# Patient Record
Sex: FEMALE | Race: WHITE | Employment: PART TIME
[De-identification: names, ages, dates, MRNs, and addresses within clinical notes are randomized per-mention and may not be internally consistent; named-entity substitution may affect disease eponyms.]

---

## 2017-07-22 ENCOUNTER — HEALTH MAINTENANCE LETTER (OUTPATIENT)
Age: 27
End: 2017-07-22

## 2019-06-29 ENCOUNTER — HOSPITAL ENCOUNTER (EMERGENCY)
Facility: CLINIC | Age: 29
Discharge: HOME | End: 2019-06-29
Attending: EMERGENCY MEDICINE | Admitting: EMERGENCY MEDICINE
Payer: COMMERCIAL

## 2019-06-29 ENCOUNTER — APPOINTMENT (OUTPATIENT)
Dept: GENERAL RADIOLOGY | Facility: CLINIC | Age: 29
End: 2019-06-29
Attending: EMERGENCY MEDICINE
Payer: COMMERCIAL

## 2019-06-29 VITALS
TEMPERATURE: 97.7 F | RESPIRATION RATE: 16 BRPM | OXYGEN SATURATION: 97 % | SYSTOLIC BLOOD PRESSURE: 110 MMHG | HEART RATE: 72 BPM | DIASTOLIC BLOOD PRESSURE: 69 MMHG

## 2019-06-29 DIAGNOSIS — R07.9 CHEST PAIN, UNSPECIFIED TYPE: ICD-10-CM

## 2019-06-29 LAB
ANION GAP SERPL CALCULATED.3IONS-SCNC: 5 MMOL/L (ref 3–14)
B-HCG FREE SERPL-ACNC: <5 IU/L
BASOPHILS # BLD AUTO: 0.1 10E9/L (ref 0–0.2)
BASOPHILS NFR BLD AUTO: 0.9 %
BUN SERPL-MCNC: 10 MG/DL (ref 7–30)
CALCIUM SERPL-MCNC: 8.3 MG/DL (ref 8.5–10.1)
CHLORIDE SERPL-SCNC: 105 MMOL/L (ref 94–109)
CO2 SERPL-SCNC: 27 MMOL/L (ref 20–32)
CREAT SERPL-MCNC: 0.8 MG/DL (ref 0.52–1.04)
D DIMER PPP FEU-MCNC: 0.3 UG/ML FEU (ref 0–0.5)
DIFFERENTIAL METHOD BLD: NORMAL
EOSINOPHIL # BLD AUTO: 0.2 10E9/L (ref 0–0.7)
EOSINOPHIL NFR BLD AUTO: 1.5 %
ERYTHROCYTE [DISTWIDTH] IN BLOOD BY AUTOMATED COUNT: 13.1 % (ref 10–15)
GFR SERPL CREATININE-BSD FRML MDRD: >90 ML/MIN/{1.73_M2}
GLUCOSE SERPL-MCNC: 93 MG/DL (ref 70–99)
HCT VFR BLD AUTO: 42.1 % (ref 35–47)
HGB BLD-MCNC: 13.8 G/DL (ref 11.7–15.7)
IMM GRANULOCYTES # BLD: 0 10E9/L (ref 0–0.4)
IMM GRANULOCYTES NFR BLD: 0.3 %
LYMPHOCYTES # BLD AUTO: 1.7 10E9/L (ref 0.8–5.3)
LYMPHOCYTES NFR BLD AUTO: 16.4 %
MCH RBC QN AUTO: 29.3 PG (ref 26.5–33)
MCHC RBC AUTO-ENTMCNC: 32.8 G/DL (ref 31.5–36.5)
MCV RBC AUTO: 89 FL (ref 78–100)
MONOCYTES # BLD AUTO: 0.9 10E9/L (ref 0–1.3)
MONOCYTES NFR BLD AUTO: 8.8 %
NEUTROPHILS # BLD AUTO: 7.6 10E9/L (ref 1.6–8.3)
NEUTROPHILS NFR BLD AUTO: 72.1 %
NRBC # BLD AUTO: 0 10*3/UL
NRBC BLD AUTO-RTO: 0 /100
PLATELET # BLD AUTO: 370 10E9/L (ref 150–450)
POTASSIUM SERPL-SCNC: 3.8 MMOL/L (ref 3.4–5.3)
RBC # BLD AUTO: 4.71 10E12/L (ref 3.8–5.2)
SODIUM SERPL-SCNC: 139 MMOL/L (ref 133–144)
TROPONIN I SERPL-MCNC: <0.015 UG/L (ref 0–0.04)
TROPONIN I SERPL-MCNC: <0.015 UG/L (ref 0–0.04)
WBC # BLD AUTO: 10.5 10E9/L (ref 4–11)

## 2019-06-29 PROCEDURE — 25000128 H RX IP 250 OP 636: Performed by: EMERGENCY MEDICINE

## 2019-06-29 PROCEDURE — 71046 X-RAY EXAM CHEST 2 VIEWS: CPT

## 2019-06-29 PROCEDURE — 96374 THER/PROPH/DIAG INJ IV PUSH: CPT

## 2019-06-29 PROCEDURE — 99285 EMERGENCY DEPT VISIT HI MDM: CPT | Mod: 25

## 2019-06-29 PROCEDURE — 84702 CHORIONIC GONADOTROPIN TEST: CPT

## 2019-06-29 PROCEDURE — 93005 ELECTROCARDIOGRAM TRACING: CPT

## 2019-06-29 PROCEDURE — 80048 BASIC METABOLIC PNL TOTAL CA: CPT | Performed by: EMERGENCY MEDICINE

## 2019-06-29 PROCEDURE — 85025 COMPLETE CBC W/AUTO DIFF WBC: CPT | Performed by: EMERGENCY MEDICINE

## 2019-06-29 PROCEDURE — 85379 FIBRIN DEGRADATION QUANT: CPT | Performed by: EMERGENCY MEDICINE

## 2019-06-29 PROCEDURE — 93005 ELECTROCARDIOGRAM TRACING: CPT | Mod: 76

## 2019-06-29 PROCEDURE — 84484 ASSAY OF TROPONIN QUANT: CPT | Mod: 91 | Performed by: EMERGENCY MEDICINE

## 2019-06-29 RX ORDER — KETOROLAC TROMETHAMINE 15 MG/ML
15 INJECTION, SOLUTION INTRAMUSCULAR; INTRAVENOUS ONCE
Status: COMPLETED | OUTPATIENT
Start: 2019-06-29 | End: 2019-06-29

## 2019-06-29 RX ADMIN — KETOROLAC TROMETHAMINE 15 MG: 15 INJECTION, SOLUTION INTRAMUSCULAR; INTRAVENOUS at 08:00

## 2019-06-29 ASSESSMENT — ENCOUNTER SYMPTOMS
FEVER: 0
DIAPHORESIS: 0
VOMITING: 0
NAUSEA: 0
COUGH: 0
SHORTNESS OF BREATH: 1

## 2019-06-29 NOTE — ED AVS SNAPSHOT
[facility]  [address]  [facility] [address]  Phone:  [phone]  Fax:  [phone]                                    Jessica Juarez   MRN: [id number]    Department:  [facility] Emergency Department   Date of Visit:  6/29/2019           After Visit Summary Signature Page    I have received my discharge instructions, and my questions have been answered. I have discussed any challenges I see with this plan with the nurse or doctor.    ..........................................................................................................................................  Patient/Patient Representative Signature      ..........................................................................................................................................  Patient Representative Print Name and Relationship to Patient    ..................................................               ................................................  Date                                   Time    ..........................................................................................................................................  Reviewed by Signature/Title    ...................................................              ..............................................  Date                                               Time          22EPIC Rev 08/18

## 2019-06-29 NOTE — ED PROVIDER NOTES
History     Chief Complaint:  Chest Pain    HPI   Jessica Juarez is a 29 year old female with a history of heart burn who presents with chest pain. The patient states she woke up around 0430 she woke up with crushing pain to her chest that 'fells like a brick is sitting on it.' She denies any radiating pain. The patient notes it is worse with breathing and exertion. The patient denies fever, cough, emesis, nausea, or diaphoresis.  No family history early MI. Patient admits to IUD though no history blood clots.     Cardiac/PE/DVT Risk Factors:  History of hypertension - no  History of hyperlipidemia - no  History of diabetes - no  History of smoking - no  Personal history of PE/DVT - no  Family history of heart complications - no  Hormone therapy- yes    Allergies:  Amoxicillin  Azithromycin  Hydrocodone-acetaminophen     Medications:    Imitrex  Mirena in place    Past Medical History:    Cellulitis  ADD  Migraines  Sepsis   Heart burn    Past Surgical History:    The patient does not have any pertinent past surgical history.    Family History:    Brother: asthma  Maternal grandmother: breast cancer  Maternal grandfather: heart disease     Social History:  Smoking Status: Never Smoker  Alcohol Use: Positive  Drug use: No  Marital Status:  Significant other      Review of Systems   Constitutional: Negative for diaphoresis and fever.   Respiratory: Positive for shortness of breath. Negative for cough.    Cardiovascular: Positive for chest pain.   Gastrointestinal: Negative for nausea and vomiting.   All other systems reviewed and are negative.    Physical Exam     Patient Vitals for the past 24 hrs:   BP Temp Temp src Pulse Resp SpO2   06/29/19 0700 -- -- -- -- -- 99 %   06/29/19 0630 122/75 -- -- 94 -- 95 %   06/29/19 0618 130/87 97.7  F (36.5  C) Temporal 99 16 100 %     Physical Exam  Nursing note and vitals reviewed.  Constitutional: Well nourished. Resting comfortably.   Eyes: Conjunctiva normal.  Pupils are  equal, round, and reactive to light.   ENT: Nose normal. Mucous membranes pink and moist.    Neck: Normal range of motion.  CVS: Normal rate, regular rhythm.  Normal heart sounds.  No murmur.  Pulmonary: Lungs clear to auscultation bilaterally. No wheezes/rales/rhonchi.  GI: Abdomen soft. Nontender, nondistended. No rigidity or guarding.    MSK: No calf tenderness or swelling.  Neuro: Alert. Follows simple commands.  Skin: Skin is warm and dry. No rash noted.   Psychiatric: Normal affect.     Emergency Department Course   ECG:  ECG taken at 0627, ECG read at 0627  Normal sinus rhythm  Nonspecific T wave abnormality  Abnormal ECG  Rate 94 bpm. NH interval 154 ms. QRS duration 88 ms. QT/QTc 366/457 ms. P-R-T axes 51 43 14.    ECG:  ECG taken at 0840, ECG read at 0840  Normal sinus rhythm  Normal ECG  Rate 91 bpm. NH interval 160 ms. QRS duration 88 ms. QT/QTc 382/469 ms. P-R-T axes 53 43 19.    Imaging:  Radiology findings were communicated with the patient who voiced understanding of the findings.    XR Chest   No acute disease.   Reading per radiology    Laboratory:  Laboratory findings were communicated with the patient  who voiced understanding of the findings.    ISTAT Qualitative Pregnancy POCT: <5.0    CBC: WBC 10.5 , HGB 13.8,   BMP: calcium 8.3((L) o/w WNL (Creatinine 0.80)  Troponin (Collected 0635): <0.015  D Dimer (Collected 0635): 0.3    Troponin (Collected 0835): <0.015    Interventions:  0800 Toradol 15 mg IV    Emergency Department Course:  Nursing notes and vitals reviewed.    0634 I performed an exam of the patient as documented above.     0635 IV was inserted and blood was drawn for laboratory testing, results above.    0641 ISTAT Qualitative pregnancy obtained, results above.     0716 The patient was sent for a XR Chest while in the emergency department, results above.     0757 I returned to update the patient.     0835 Troponin obtained, results above.     0920 Prior to discharge, I  personally reviewed the imaging and laboratory results with the patient and answered all related questions . Patient voiced understanding.     Impression & Plan      Medical Decision Making:  HEART Score:    Predicts Major Adverse Cardiac Events within 6 weeks:    History:   Highly suspicious:  2   Moderately suspicious: 1   Slightly/Non-suspicious: 0  ECG:   Significant ST depression 2   Nonspecific repolarization 1   Normal    0  Age:    >=65    2   >45-<65   1   <= 45    0  Risk Factors [DM, Current or recent smoker, HTN, HLP, FMH CAD, Obesity]   >=3 or Hx. CAD  2   1-2    1   None    0  Troponin:   >= 3x normal   2   >1 to <3x normal  1   Normal    0    This Patient's Score:   1    Interpretation:    0-3:    2.5% risk of MACE (may consider D/C)   4-6:    20.3% (Observation)   7-10:    72.7% (Admit, consider early invasive strategy)        Jessica Juarez is a 29 year old female who presents to the emergency department today for evaluation of chest pain. Triage note reported head and neck pain though patient denies. Patient is nontoxic, well-hydrated on arrival.  She is in no significant distress.  EKG without focal ischemia or underlying arrhythmia. Troponin neg x 2.  Patient has a low HEART score and I doubt ACS.  She reported some symptom improvement after Toradol.  D-dimer screen ordered in setting of birth control though negative; lower suspicion for PE.  Patient's labs unremarkable. CXR without focal pneumonia, pneumothorax, widened mediastinum or acute process.  Discussed with patient no indication for further emergent workup at this time; possible costochondritis, recommended ibuprofen on discharge.  Plans for close outpatient f/u. Return precautions given.     Diagnosis:    ICD-10-CM    1. Chest pain, unspecified type R07.9 Troponin I (now)      Disposition:   Discharged to home    Discharge Medications:  No discharge medication.     Scribe Disclosure:  Anna TAI, am serving as a scribe at 6:37  AM on 6/29/2019 to document services personally performed by Felisa Houston DO based on my observations and the provider's statements to me.  [facility] EMERGENCY DEPARTMENT       Felisa Houston DO  06/29/19 0933

## 2019-06-29 NOTE — ED TRIAGE NOTES
Pt in with C/O midsternal chest pain and SOB, Pt reports pain increases when she takes a deep breath. Pt denies pain is reproducible. No cough, nausea or vomiting. Pt reports head and neck pain in addition

## 2019-07-01 LAB
INTERPRETATION ECG - MUSE: NORMAL
INTERPRETATION ECG - MUSE: NORMAL

## 2019-11-04 ENCOUNTER — HEALTH MAINTENANCE LETTER (OUTPATIENT)
Age: 29
End: 2019-11-04

## 2020-02-19 ENCOUNTER — OFFICE VISIT (OUTPATIENT)
Dept: FAMILY MEDICINE | Facility: CLINIC | Age: 30
End: 2020-02-19
Payer: COMMERCIAL

## 2020-02-19 ENCOUNTER — TRANSFERRED RECORDS (OUTPATIENT)
Dept: HEALTH INFORMATION MANAGEMENT | Facility: CLINIC | Age: 30
End: 2020-02-19

## 2020-02-19 VITALS
SYSTOLIC BLOOD PRESSURE: 124 MMHG | DIASTOLIC BLOOD PRESSURE: 82 MMHG | HEART RATE: 75 BPM | TEMPERATURE: 97 F | BODY MASS INDEX: 40.59 KG/M2 | WEIGHT: 215 LBS | OXYGEN SATURATION: 96 % | HEIGHT: 61 IN

## 2020-02-19 DIAGNOSIS — E28.2 PCOS (POLYCYSTIC OVARIAN SYNDROME): ICD-10-CM

## 2020-02-19 DIAGNOSIS — F33.40 RECURRENT MAJOR DEPRESSIVE DISORDER, IN REMISSION (H): ICD-10-CM

## 2020-02-19 DIAGNOSIS — S09.90XA TRAUMATIC INJURY OF HEAD, INITIAL ENCOUNTER: Primary | ICD-10-CM

## 2020-02-19 PROCEDURE — 99204 OFFICE O/P NEW MOD 45 MIN: CPT | Performed by: INTERNAL MEDICINE

## 2020-02-19 RX ORDER — BUPROPION HYDROCHLORIDE 150 MG/1
150 TABLET ORAL EVERY MORNING
COMMUNITY

## 2020-02-19 ASSESSMENT — MIFFLIN-ST. JEOR: SCORE: 1624.67

## 2020-02-19 NOTE — PATIENT INSTRUCTIONS
We will let you know the ct report.  Assuming that is normal then call if you get worse or it's not going away in the next 3 days

## 2020-02-19 NOTE — PROGRESS NOTES
The patient presents as an acute walk-in for evaluation of head trauma that occurred 1 hour before.  The patient had the back trunk door raised up and was bending over when suddenly the door collapsed and hit her on the top of the head.  This did not cause her to faint.  However, since then she has had a lot of pain in that area and also feels somewhat out of it as well as some abdominal discomfort that she did not have before.  No vomiting.  No fevers.  No vision, motor, gait or speech changes.  No history of this before.  She is not on any blood thinners or have any history of bleeding disorder.    Past Medical History:   Diagnosis Date     Major depression      PCOS (polycystic ovarian syndrome)      Past Surgical History:   Procedure Laterality Date     NONE OF THE ABOVE CORE MEASURE DIAGNOSES       Social History     Socioeconomic History     Marital status: Single     Spouse name: Not on file     Number of children: 2     Years of education: Not on file     Highest education level: Not on file   Occupational History     Occupation: software   Social Needs     Financial resource strain: Not on file     Food insecurity:     Worry: Not on file     Inability: Not on file     Transportation needs:     Medical: Not on file     Non-medical: Not on file   Tobacco Use     Smoking status: Never Smoker     Smokeless tobacco: Never Used   Substance and Sexual Activity     Alcohol use: Yes     Comment: occasionally     Drug use: No     Sexual activity: Yes     Partners: Male     Comment: Single; Daughter (2)   Lifestyle     Physical activity:     Days per week: Not on file     Minutes per session: Not on file     Stress: Not on file   Relationships     Social connections:     Talks on phone: Not on file     Gets together: Not on file     Attends Gnosticism service: Not on file     Active member of club or organization: Not on file     Attends meetings of clubs or organizations: Not on file     Relationship status: Not on  "file     Intimate partner violence:     Fear of current or ex partner: Not on file     Emotionally abused: Not on file     Physically abused: Not on file     Forced sexual activity: Not on file   Other Topics Concern     Parent/sibling w/ CABG, MI or angioplasty before 65F 55M? Not Asked   Social History Narrative     Not on file     Current Outpatient Medications   Medication Sig Dispense Refill     buPROPion 150 MG PO 24 hr tablet Take 150 mg by mouth every morning       levonorgestrel 20 MCG/24HR IU IUD 1 each by Intrauterine route once       METFORMIN HCL PO        Allergies   Allergen Reactions     Vicodin [Hydrocodone-Acetaminophen] Nausea and Vomiting     FAMILY HISTORY NOTED AND REVIEWED    REVIEW OF SYSTEMS: above    PHYSICAL EXAM    /82 (BP Location: Left arm, Patient Position: Chair, Cuff Size: Adult Large)   Pulse 75   Temp 97  F (36.1  C) (Oral)   Ht 1.537 m (5' 0.5\")   Wt 97.5 kg (215 lb)   SpO2 96%   Breastfeeding No   BMI 41.30 kg/m      Patient appears non toxic  Lungs clear  cv reglar rate and rhythm, no murmer, rub or gallop  Abdomen normal active bowel sounds, soft non-tender  tms and canals within normal limits  Mouth within normal limits  Neuro: alert, oriented, speech fluent, cn within normal limits, motor and reflexes and gait within normal limits, ffm and finger to nose within normal limits, romberg neg  She is tender on the top of her scalp near the occiput on the right side and there is a small amount of swelling but no redness or blood present.    ASSESSMENT:  Acute head trauma, no loss of consciousness and no focal findings except for the hematoma.  However, she does have a significant headache with some GI upset and is feeling out of it so I think it is prudent to check a head CT to rule out bleed.  I suspect this will be negative.  If it is negative she is asked to call if her symptoms worsen or change or the not going away in the next few days.    Rory Logan, " M.D.

## 2020-11-16 ENCOUNTER — HEALTH MAINTENANCE LETTER (OUTPATIENT)
Age: 30
End: 2020-11-16

## 2021-09-18 ENCOUNTER — HEALTH MAINTENANCE LETTER (OUTPATIENT)
Age: 31
End: 2021-09-18

## 2022-01-08 ENCOUNTER — HEALTH MAINTENANCE LETTER (OUTPATIENT)
Age: 32
End: 2022-01-08

## 2022-11-20 ENCOUNTER — HEALTH MAINTENANCE LETTER (OUTPATIENT)
Age: 32
End: 2022-11-20

## 2023-04-15 ENCOUNTER — HEALTH MAINTENANCE LETTER (OUTPATIENT)
Age: 33
End: 2023-04-15

## 2025-05-28 ENCOUNTER — APPOINTMENT (OUTPATIENT)
Dept: GENERAL RADIOLOGY | Facility: CLINIC | Age: 35
End: 2025-05-28
Attending: EMERGENCY MEDICINE
Payer: COMMERCIAL

## 2025-05-28 ENCOUNTER — HOSPITAL ENCOUNTER (EMERGENCY)
Facility: CLINIC | Age: 35
Discharge: HOME | End: 2025-05-28
Attending: EMERGENCY MEDICINE
Payer: COMMERCIAL

## 2025-05-28 VITALS
BODY MASS INDEX: 44.46 KG/M2 | DIASTOLIC BLOOD PRESSURE: 69 MMHG | HEART RATE: 71 BPM | TEMPERATURE: 98.3 F | SYSTOLIC BLOOD PRESSURE: 128 MMHG | WEIGHT: 231.48 LBS | OXYGEN SATURATION: 97 % | RESPIRATION RATE: 16 BRPM

## 2025-05-28 DIAGNOSIS — R07.9 CHEST PAIN, UNSPECIFIED TYPE: ICD-10-CM

## 2025-05-28 LAB
ANION GAP SERPL CALCULATED.3IONS-SCNC: 12 MMOL/L (ref 7–15)
ATRIAL RATE - MUSE: 82 BPM
BASOPHILS # BLD AUTO: 0.1 10E3/UL (ref 0–0.2)
BASOPHILS NFR BLD AUTO: 1 %
BUN SERPL-MCNC: 9.4 MG/DL (ref 6–20)
CALCIUM SERPL-MCNC: 9.2 MG/DL (ref 8.8–10.4)
CHLORIDE SERPL-SCNC: 103 MMOL/L (ref 98–107)
CREAT SERPL-MCNC: 0.93 MG/DL (ref 0.51–0.95)
DIASTOLIC BLOOD PRESSURE - MUSE: NORMAL MMHG
EGFRCR SERPLBLD CKD-EPI 2021: 81 ML/MIN/1.73M2
EOSINOPHIL # BLD AUTO: 0.3 10E3/UL (ref 0–0.7)
EOSINOPHIL NFR BLD AUTO: 4 %
ERYTHROCYTE [DISTWIDTH] IN BLOOD BY AUTOMATED COUNT: 12.8 % (ref 10–15)
FLUAV RNA SPEC QL NAA+PROBE: NEGATIVE
FLUBV RNA RESP QL NAA+PROBE: NEGATIVE
GLUCOSE SERPL-MCNC: 97 MG/DL (ref 70–99)
HCO3 SERPL-SCNC: 23 MMOL/L (ref 22–29)
HCT VFR BLD AUTO: 42.4 % (ref 35–47)
HGB BLD-MCNC: 14.2 G/DL (ref 11.7–15.7)
HOLD SPECIMEN: NORMAL
HOLD SPECIMEN: NORMAL
IMM GRANULOCYTES # BLD: 0 10E3/UL
IMM GRANULOCYTES NFR BLD: 0 %
INTERPRETATION ECG - MUSE: NORMAL
LYMPHOCYTES # BLD AUTO: 2.4 10E3/UL (ref 0.8–5.3)
LYMPHOCYTES NFR BLD AUTO: 32 %
MCH RBC QN AUTO: 29.2 PG (ref 26.5–33)
MCHC RBC AUTO-ENTMCNC: 33.5 G/DL (ref 31.5–36.5)
MCV RBC AUTO: 87 FL (ref 78–100)
MONOCYTES # BLD AUTO: 0.7 10E3/UL (ref 0–1.3)
MONOCYTES NFR BLD AUTO: 9 %
NEUTROPHILS # BLD AUTO: 3.9 10E3/UL (ref 1.6–8.3)
NEUTROPHILS NFR BLD AUTO: 53 %
NRBC # BLD AUTO: 0 10E3/UL
NRBC BLD AUTO-RTO: 0 /100
P AXIS - MUSE: 52 DEGREES
PLATELET # BLD AUTO: 396 10E3/UL (ref 150–450)
POTASSIUM SERPL-SCNC: 4.2 MMOL/L (ref 3.4–5.3)
PR INTERVAL - MUSE: 148 MS
QRS DURATION - MUSE: 86 MS
QT - MUSE: 414 MS
QTC - MUSE: 483 MS
R AXIS - MUSE: 39 DEGREES
RBC # BLD AUTO: 4.87 10E6/UL (ref 3.8–5.2)
RSV RNA SPEC NAA+PROBE: NEGATIVE
SARS-COV-2 RNA RESP QL NAA+PROBE: NEGATIVE
SODIUM SERPL-SCNC: 138 MMOL/L (ref 135–145)
SYSTOLIC BLOOD PRESSURE - MUSE: NORMAL MMHG
T AXIS - MUSE: 23 DEGREES
TROPONIN T SERPL HS-MCNC: 8 NG/L
TROPONIN T SERPL HS-MCNC: <6 NG/L
VENTRICULAR RATE- MUSE: 82 BPM
WBC # BLD AUTO: 7.3 10E3/UL (ref 4–11)

## 2025-05-28 PROCEDURE — 99285 EMERGENCY DEPT VISIT HI MDM: CPT | Mod: 25

## 2025-05-28 PROCEDURE — 250N000011 HC RX IP 250 OP 636: Performed by: EMERGENCY MEDICINE

## 2025-05-28 PROCEDURE — 71046 X-RAY EXAM CHEST 2 VIEWS: CPT

## 2025-05-28 PROCEDURE — 93005 ELECTROCARDIOGRAM TRACING: CPT | Mod: 76

## 2025-05-28 PROCEDURE — 85004 AUTOMATED DIFF WBC COUNT: CPT | Performed by: EMERGENCY MEDICINE

## 2025-05-28 PROCEDURE — 250N000013 HC RX MED GY IP 250 OP 250 PS 637: Performed by: EMERGENCY MEDICINE

## 2025-05-28 PROCEDURE — 96374 THER/PROPH/DIAG INJ IV PUSH: CPT

## 2025-05-28 PROCEDURE — 80048 BASIC METABOLIC PNL TOTAL CA: CPT | Performed by: EMERGENCY MEDICINE

## 2025-05-28 PROCEDURE — 93005 ELECTROCARDIOGRAM TRACING: CPT

## 2025-05-28 PROCEDURE — 36415 COLL VENOUS BLD VENIPUNCTURE: CPT | Performed by: EMERGENCY MEDICINE

## 2025-05-28 PROCEDURE — 250N000011 HC RX IP 250 OP 636: Mod: JZ | Performed by: EMERGENCY MEDICINE

## 2025-05-28 PROCEDURE — 84484 ASSAY OF TROPONIN QUANT: CPT | Performed by: EMERGENCY MEDICINE

## 2025-05-28 PROCEDURE — 96375 TX/PRO/DX INJ NEW DRUG ADDON: CPT

## 2025-05-28 PROCEDURE — 87637 SARSCOV2&INF A&B&RSV AMP PRB: CPT | Performed by: EMERGENCY MEDICINE

## 2025-05-28 RX ORDER — ACETAMINOPHEN 500 MG
1000 TABLET ORAL ONCE
Status: COMPLETED | OUTPATIENT
Start: 2025-05-28 | End: 2025-05-28

## 2025-05-28 RX ORDER — MORPHINE SULFATE 4 MG/ML
4 INJECTION, SOLUTION INTRAMUSCULAR; INTRAVENOUS
Refills: 0 | Status: DISCONTINUED | OUTPATIENT
Start: 2025-05-28 | End: 2025-05-28 | Stop reason: HOSPADM

## 2025-05-28 RX ORDER — KETOROLAC TROMETHAMINE 15 MG/ML
15 INJECTION, SOLUTION INTRAMUSCULAR; INTRAVENOUS ONCE
Status: COMPLETED | OUTPATIENT
Start: 2025-05-28 | End: 2025-05-28

## 2025-05-28 RX ORDER — ASPIRIN 81 MG/1
324 TABLET, CHEWABLE ORAL ONCE
Status: COMPLETED | OUTPATIENT
Start: 2025-05-28 | End: 2025-05-28

## 2025-05-28 RX ORDER — ONDANSETRON 2 MG/ML
4 INJECTION INTRAMUSCULAR; INTRAVENOUS ONCE
Status: COMPLETED | OUTPATIENT
Start: 2025-05-28 | End: 2025-05-28

## 2025-05-28 RX ADMIN — ASPIRIN 81 MG CHEWABLE TABLET 324 MG: 81 TABLET CHEWABLE at 04:54

## 2025-05-28 RX ADMIN — KETOROLAC TROMETHAMINE 15 MG: 15 INJECTION, SOLUTION INTRAMUSCULAR; INTRAVENOUS at 05:36

## 2025-05-28 RX ADMIN — MORPHINE SULFATE 4 MG: 4 INJECTION INTRAVENOUS at 04:55

## 2025-05-28 RX ADMIN — ACETAMINOPHEN 1000 MG: 500 TABLET ORAL at 07:28

## 2025-05-28 RX ADMIN — ONDANSETRON 4 MG: 2 INJECTION, SOLUTION INTRAMUSCULAR; INTRAVENOUS at 07:28

## 2025-05-28 ASSESSMENT — ACTIVITIES OF DAILY LIVING (ADL)
ADLS_ACUITY_SCORE: 41

## 2025-05-28 ASSESSMENT — COLUMBIA-SUICIDE SEVERITY RATING SCALE - C-SSRS
1. IN THE PAST MONTH, HAVE YOU WISHED YOU WERE DEAD OR WISHED YOU COULD GO TO SLEEP AND NOT WAKE UP?: NO
2. HAVE YOU ACTUALLY HAD ANY THOUGHTS OF KILLING YOURSELF IN THE PAST MONTH?: NO
6. HAVE YOU EVER DONE ANYTHING, STARTED TO DO ANYTHING, OR PREPARED TO DO ANYTHING TO END YOUR LIFE?: NO

## 2025-05-28 NOTE — ED TRIAGE NOTES
"Pt here with c/o CP that woke her up from her sleep. Pt describes pain as a \"brick on her chest.\" Denies cardiac hx. Endorses SOB. Took baby Asprin at home         "

## 2025-05-28 NOTE — ED PROVIDER NOTES
Emergency Department Note      History of Present Illness     Chief Complaint   Chest Pain      HPI   Jessica Juarez is a 35 year old female who presents to the emergency department with chest pain that woke her from sleep.  Patient reports waking up shortly before arrival with the feeling of a brick on her chest.  She also endorses some pleuritic chest pain and shortness of breath.  She notes going to bed feeling well.  Denies any previous fever, cough or congestion.  Currently denies any nausea or vomiting.  Has not had pain like this before in the past.  Denies any abdominal pain.  Denies any history of hypertension, hyperlipidemia or diabetes.  She notes her grandfather  of coronary artery disease in his 80s.  Mother and father have not had any history of coronary artery disease to her knowledge.  She denies any pain or swelling in her legs.  Denies any recent surgeries.  No recent travel.    Past Medical History     Medical History and Problem List   Past Medical History:   Diagnosis Date    Major depression     PCOS (polycystic ovarian syndrome)        Medications   buPROPion 150 MG PO 24 hr tablet  levonorgestrel 20 MCG/24HR IU IUD  METFORMIN HCL PO        Surgical History   Past Surgical History:   Procedure Laterality Date    NONE OF THE ABOVE CORE MEASURE DIAGNOSES         Physical Exam     Patient Vitals for the past 24 hrs:   BP Temp Temp src Pulse Resp SpO2 Weight   25 0531 -- -- -- 63 15 100 % --   25 0530 (!) 142/78 -- -- 66 14 -- --   25 0444 -- -- -- -- -- 98 % --   25 0443 -- -- -- -- -- 100 % --   25 0442 136/87 -- -- 68 -- -- --   25 0439 (!) 143/108 98.1  F (36.7  C) Oral 65 18 100 % 105 kg (231 lb 7.7 oz)     Physical Exam  General: Patient is awake, alert and interactive  Head: The scalp, face, and head appear normal  Eyes: The pupils are equal, round, and reactive to light. Conjunctivae and sclerae are normal  Neck: Normal range of motion.  CV:  Regular rate and rhythm. Peripheral pulses including radial pulses are symmetric.   Resp: Lungs are clear without wheezes or rales. No respiratory distress.   GI: Abdomen is soft, no rigidity, guarding, or rebound. No distension. No tenderness to palpation in any quadrant.    MS: Chest wall is non tender to palpation. No asymmetric leg swelling, calf or thigh tenderness.    Skin: No rash or lesions noted. Normal capillary refill noted  Neuro: Speech is normal and fluent. Face is symmetric. Moving all extremities.   Psych:  Normal affect.  Appropriate interactions.     Diagnostics     Lab Results   Labs Ordered and Resulted from Time of ED Arrival to Time of ED Departure   BASIC METABOLIC PANEL - Normal       Result Value    Sodium 138      Potassium 4.2      Chloride 103      Carbon Dioxide (CO2) 23      Anion Gap 12      Urea Nitrogen 9.4      Creatinine 0.93      GFR Estimate 81      Calcium 9.2      Glucose 97     TROPONIN T, HIGH SENSITIVITY - Normal    Troponin T, High Sensitivity 8     INFLUENZA A/B, RSV AND SARS-COV2 PCR - Normal    Influenza A PCR Negative      Influenza B PCR Negative      RSV PCR Negative      SARS CoV2 PCR Negative     CBC WITH PLATELETS AND DIFFERENTIAL    WBC Count 7.3      RBC Count 4.87      Hemoglobin 14.2      Hematocrit 42.4      MCV 87      MCH 29.2      MCHC 33.5      RDW 12.8      Platelet Count 396      % Neutrophils 53      % Lymphocytes 32      % Monocytes 9      % Eosinophils 4      % Basophils 1      % Immature Granulocytes 0      NRBCs per 100 WBC 0      Absolute Neutrophils 3.9      Absolute Lymphocytes 2.4      Absolute Monocytes 0.7      Absolute Eosinophils 0.3      Absolute Basophils 0.1      Absolute Immature Granulocytes 0.0      Absolute NRBCs 0.0     TROPONIN T, HIGH SENSITIVITY       Imaging   XR Chest 2 Views   Final Result   IMPRESSION: Negative chest.        EKG   ECG results from 05/28/25   EKG 12-lead, tracing only     Value    Systolic Blood Pressure      Diastolic Blood Pressure     Ventricular Rate 82    Atrial Rate 82    ME Interval 148    QRS Duration 86        QTc 483    P Axis 52    R AXIS 39    T Axis 23    Interpretation ECG      Sinus rhythm  QTcB >= 480 msec  Abnormal ECG  When compared with ECG of 29-Jun-2019 08:40,  No significant change was found        Independent Interpretation   Chest x-ray shows no signs of pneumonia     ED Course      Medications Administered   Medications   morphine (PF) injection 4 mg (4 mg Intravenous $Given 5/28/25 0455)   aspirin (ASA) chewable tablet 324 mg (324 mg Oral $Given 5/28/25 3544)   ketorolac (TORADOL) injection 15 mg (15 mg Intravenous $Given 5/28/25 7536)       Medical Decision Making / Diagnosis     HALEY Juarez is a 35 year old female who presents to the emergency department with chest pain that woke her from sleep.  Upon initial evaluation she is mildly hypertensive but otherwise hemodynamically stable.  Upon initial evaluation she does not appear in acute distress.  EKG was obtained which shows no acute signs of ischemia nor dysrhythmia.  Initial troponin was measurable but not significantly elevated.  Patient will have a 2-hour repeat performed.  If this remains negative very unlikely that her presentation represents acute coronary syndrome.  Alternative pathologies were also considered.  PE is considered less likely as the patient is negative by PERC criteria.  Chest x-ray was obtained which shows no signs of pneumonia, pneumothorax, wide mediastinum, pleural effusion or pulmonary edema.  COVID, influenza and RSV testing negative.  Patient's symptoms were mildly improved after the aforementioned interventions.  I anticipate if troponin is negative patient likely can be discharged home if symptoms do not evolve.    Disposition   Likely Discharge home if repeat troponin is negative.     Diagnosis     ICD-10-CM    1. Chest pain, unspecified type  R07.9          Diego Loomis MD       Diego Loomis MD  05/28/25 0606

## 2025-05-28 NOTE — DISCHARGE INSTRUCTIONS
Please return if you have worsening or change in your symptoms or if you develop any new concerns.  Otherwise, please follow with your primary care provider to ensure you are improving as expected and do not require any nonemergent outpatient testing.

## 2025-05-28 NOTE — ED PROVIDER NOTES
This 35 year old female patient was endorsed to me by Dr. Loomis pending repeat troponin for cheat pain.  She has been treated with aspirin, morphine, and Toradol.    I have reviewed patient's vitals, EKG, imaging, labs, and notes which are unremarkable.    0725 I was notified the patient was complaining of recurrent pain. Repeat EKG ordered. Tylenol and Zofran for headache and nausea.     EKG  Indication: chest pain  Time: 0732  Rate 55 bpm. IN interval 160. QRS duration 88. QT/QTc 470/449.   Sinus bradycardia with sinus arrhythmia  Otherwise normal ECG  No acute ST changes.  Right decreased as compared to prior, dated today (5/28/25).    0828 I evaluated the patient who continues to complain of chest pressure.  She looks very well.  Vital signs are unremarkable.  Repeat troponin is undetectable.  She is PERC negative making PE unlikely.  She is comfortable with plan for discharge.  I encouraged primary care follow-up though she does agree to return if she has worsening or change in her symptoms or development of new concerns as exact etiology of the pain is unclear.     Mai Jang MD  05/28/25 4078

## 2025-05-29 LAB
ATRIAL RATE - MUSE: 55 BPM
DIASTOLIC BLOOD PRESSURE - MUSE: NORMAL MMHG
INTERPRETATION ECG - MUSE: NORMAL
P AXIS - MUSE: 57 DEGREES
PR INTERVAL - MUSE: 160 MS
QRS DURATION - MUSE: 88 MS
QT - MUSE: 470 MS
QTC - MUSE: 449 MS
R AXIS - MUSE: 46 DEGREES
SYSTOLIC BLOOD PRESSURE - MUSE: NORMAL MMHG
T AXIS - MUSE: 24 DEGREES
VENTRICULAR RATE- MUSE: 55 BPM

## 2025-05-31 ENCOUNTER — HEALTH MAINTENANCE LETTER (OUTPATIENT)
Age: 35
End: 2025-05-31